# Patient Record
Sex: MALE | Race: BLACK OR AFRICAN AMERICAN | NOT HISPANIC OR LATINO | Employment: OTHER | URBAN - NONMETROPOLITAN AREA
[De-identification: names, ages, dates, MRNs, and addresses within clinical notes are randomized per-mention and may not be internally consistent; named-entity substitution may affect disease eponyms.]

---

## 2022-12-14 ENCOUNTER — OFFICE VISIT (OUTPATIENT)
Dept: PULMONOLOGY | Facility: CLINIC | Age: 67
End: 2022-12-14

## 2022-12-14 VITALS
OXYGEN SATURATION: 100 % | WEIGHT: 208.8 LBS | SYSTOLIC BLOOD PRESSURE: 135 MMHG | DIASTOLIC BLOOD PRESSURE: 80 MMHG | BODY MASS INDEX: 27.67 KG/M2 | RESPIRATION RATE: 18 BRPM | HEART RATE: 102 BPM | HEIGHT: 73 IN

## 2022-12-14 DIAGNOSIS — R06.02 SHORTNESS OF BREATH: Primary | ICD-10-CM

## 2022-12-14 DIAGNOSIS — J44.9 CHRONIC OBSTRUCTIVE PULMONARY DISEASE, UNSPECIFIED COPD TYPE: ICD-10-CM

## 2022-12-14 DIAGNOSIS — J41.0 CHRONIC BRONCHITIS, SIMPLE: ICD-10-CM

## 2022-12-14 DIAGNOSIS — Z87.891 PERSONAL HISTORY OF NICOTINE DEPENDENCE: ICD-10-CM

## 2022-12-14 PROCEDURE — 99204 OFFICE O/P NEW MOD 45 MIN: CPT | Performed by: INTERNAL MEDICINE

## 2022-12-14 PROCEDURE — 90471 IMMUNIZATION ADMIN: CPT | Performed by: INTERNAL MEDICINE

## 2022-12-14 PROCEDURE — 90662 IIV NO PRSV INCREASED AG IM: CPT | Performed by: INTERNAL MEDICINE

## 2022-12-14 RX ORDER — ALBUTEROL SULFATE 90 UG/1
2 AEROSOL, METERED RESPIRATORY (INHALATION) EVERY 4 HOURS PRN
COMMUNITY
End: 2022-12-14 | Stop reason: SDUPTHER

## 2022-12-14 RX ORDER — HALOPERIDOL 2 MG/ML
SOLUTION ORAL 2 TIMES DAILY
COMMUNITY

## 2022-12-14 RX ORDER — AMLODIPINE BESYLATE 10 MG/1
10 TABLET ORAL DAILY
COMMUNITY

## 2022-12-14 RX ORDER — FLUTICASONE FUROATE AND VILANTEROL 100; 25 UG/1; UG/1
1 POWDER RESPIRATORY (INHALATION) DAILY
Qty: 60 EACH | Refills: 5 | Status: SHIPPED | OUTPATIENT
Start: 2022-12-14

## 2022-12-14 RX ORDER — ALBUTEROL SULFATE 90 UG/1
2 AEROSOL, METERED RESPIRATORY (INHALATION) EVERY 4 HOURS PRN
Qty: 18 G | Refills: 5 | Status: SHIPPED | OUTPATIENT
Start: 2022-12-14

## 2022-12-14 NOTE — PROGRESS NOTES
CONSULT NOTE    Requested by:   No ref. provider found   Vanesa Toledo APRN      Chief Complaint   Patient presents with   • Shortness of Breath   • Consult       Subjective:  Silverio Ulloa is a 67 y.o. male.     History of Present Illness   Patient is a poor historian.     Patient comes in today for evaluation of shortness of breath. Patient says that for the past 2-3 years, he has been noticing that his exercise tolerance has been declining. The patient has had days when walking any significant distance is difficult to do without stopping in the middle, to catch his breath.     Patient also complains of some cough and phlegm production that occurs on most mornings out of the week, for most weeks out of the month, for the past few years. Patient used to smoke 1 pack per day for the past 15-16 years and quit smoking 1 year ago.     There seems to be a seasonal variation to the symptoms.    he does not have a family history of chronic obstructive disease. he used to work in a mechanical factory setting up car motors.      The following portions of the patient's history were reviewed and updated as appropriate: allergies, current medications, past family history, past medical history, past social history and past surgical history.    Review of Systems   HENT: Positive for sinus pressure. Negative for sneezing and sore throat.    Respiratory: Positive for cough, shortness of breath and wheezing. Negative for chest tightness.    Cardiovascular: Positive for palpitations. Negative for leg swelling.   All other systems reviewed and are negative.      Past Medical History:   Diagnosis Date   • Asthma, extrinsic    • Congestive heart failure (CHF) ([facility])    • COPD (chronic obstructive pulmonary disease) ([facility])    • GERD (gastroesophageal reflux disease)    • High blood pressure        Social History     Tobacco Use   • Smoking status: Former     Packs/day: 1.00     Years: 13.00     Pack years: 13.00     Types: Cigarettes  "  • Smokeless tobacco: Never   Substance Use Topics   • Alcohol use: Not on file         Objective:  Visit Vitals  /80   Pulse 102   Resp 18   Ht 185.4 cm (73\")   Wt 94.7 kg (208 lb 12.8 oz)   SpO2 100%   BMI 27.55 kg/m²       Physical Exam  Vitals reviewed.   Constitutional:       Appearance: He is well-developed.   HENT:      Head:      Comments: No acute lesions noted     Nose:      Comments: Mild nasal erythema noted.     Mouth/Throat:      Mouth: Mucous membranes are moist.   Neck:      Vascular: No JVD.   Cardiovascular:      Rate and Rhythm: Normal rate and regular rhythm.   Pulmonary:      Effort: Pulmonary effort is normal.      Breath sounds: Wheezing present. No rales.      Comments: Somewhat hyperresonant to percussion.  Somewhat decreased air entry.  Mild scattered wheezing noted.   Musculoskeletal:      Cervical back: Neck supple.      Right lower leg: No edema.      Left lower leg: No edema.      Comments: Gait was normal.   Skin:     General: Skin is warm and dry.   Neurological:      Mental Status: He is alert and oriented to person, place, and time.   Psychiatric:         Mood and Affect: Mood normal.         Behavior: Behavior normal.         Assessment/Plan:  Diagnoses and all orders for this visit:    1. Shortness of breath (Primary)  -     Pulmonary Function Test; Future  -     Overnight Sleep Oximetry Study; Future    2. Chronic obstructive pulmonary disease, unspecified COPD type (HCC)  -     Alpha - 1 - Antitrypsin Phenotype; Future  -     Pulmonary Function Test; Future  -     Overnight Sleep Oximetry Study; Future    3. Personal history of nicotine dependence  -      CT Chest Low Dose Cancer Screening WO; Future    4. Chronic bronchitis, simple (HCC)    Other orders  -     Fluzone High-Dose 65+yrs  -     Fluticasone Furoate-Vilanterol 100-25 MCG/ACT aerosol powder ; Inhale 1 puff Daily. Rinse mouth with water after use.  Dispense: 60 each; Refill: 5  -     albuterol sulfate  " (90 Base) MCG/ACT inhaler; Inhale 2 puffs Every 4 (Four) Hours As Needed for Wheezing.  Dispense: 18 g; Refill: 5        Return in about 3 months (around 3/14/2023) for Labs, CT, PFT F/U, For Meg Lyons), ...Also 8 mths w/Alla, ....Also 13 mths w/ Dr. Peacock.    DISCUSSION(if any):  I have also reviewed his referring provider's last note that mentions shortness of breath when walking.  It also mentioned COPD and dry cough.  Schizophrenia was also mentioned.    ===========================  ===========================    PFTs were ordered for follow up visit.     ===========================  ===========================    Orders as above    Based on history and physical exam, it seems that his shortness of breath is most likely from obstructive lung disease.     Patient was educated on compliance with, and the correct method of using the pulmonary medicines.     Side effects, of prescribed medicines, discussed.    I have told him that we will made further recommendations, based on clinical response.     The patient belongs to the risk group for which lung cancer screening has been recommended. We will try to make arrangements for the same and this will be indicated soon. This has been ordered    Patient was given reading material, as appropriate.     I have encouraged the patient to call or schedule a visit earlier, if there are any concerns.    The following have been ordered, either before or at the time of the next visit.    CT of the chest  Laboratory work-up  PFTs/spirometry  Overnight pulse ox      Dictated utilizing Dragon dictation.    This document was electronically signed by Natasha Peacock MD on 12/14/22 at 11:27 EST

## 2023-01-25 ENCOUNTER — HOSPITAL ENCOUNTER (OUTPATIENT)
Dept: CT IMAGING | Facility: HOSPITAL | Age: 68
Discharge: HOME | End: 2023-01-25
Payer: MEDICARE

## 2023-01-25 ENCOUNTER — HOSPITAL ENCOUNTER (OUTPATIENT)
Dept: PULMONOLOGY | Facility: HOSPITAL | Age: 68
Discharge: HOME | End: 2023-01-25
Payer: MEDICARE

## 2023-01-25 DIAGNOSIS — R06.02 SHORTNESS OF BREATH: ICD-10-CM

## 2023-01-25 DIAGNOSIS — J44.9 CHRONIC OBSTRUCTIVE PULMONARY DISEASE, UNSPECIFIED COPD TYPE: ICD-10-CM

## 2023-01-25 DIAGNOSIS — Z87.891 PERSONAL HISTORY OF NICOTINE DEPENDENCE: ICD-10-CM

## 2023-01-25 PROCEDURE — 94729 DIFFUSING CAPACITY: CPT | Performed by: INTERNAL MEDICINE

## 2023-01-25 PROCEDURE — 94726 PLETHYSMOGRAPHY LUNG VOLUMES: CPT

## 2023-01-25 PROCEDURE — 94010 BREATHING CAPACITY TEST: CPT | Performed by: INTERNAL MEDICINE

## 2023-01-25 PROCEDURE — 94729 DIFFUSING CAPACITY: CPT

## 2023-01-25 PROCEDURE — 94726 PLETHYSMOGRAPHY LUNG VOLUMES: CPT | Performed by: INTERNAL MEDICINE

## 2023-01-25 PROCEDURE — 71271 CT THORAX LUNG CANCER SCR C-: CPT

## 2023-01-25 PROCEDURE — 94010 BREATHING CAPACITY TEST: CPT

## 2023-02-03 DIAGNOSIS — R06.02 SHORTNESS OF BREATH: Primary | ICD-10-CM

## 2023-02-03 DIAGNOSIS — R06.02 SHORTNESS OF BREATH: ICD-10-CM
